# Patient Record
(demographics unavailable — no encounter records)

---

## 2024-12-11 NOTE — PHYSICAL EXAM
[No Acute Distress] : no acute distress [No Varicosities] : no varicosities [No Edema] : there was no peripheral edema [Alert and Oriented x3] : oriented to person, place, and time [Normal] : affect was normal and insight and judgment were intact [de-identified] : done by GYN

## 2024-12-11 NOTE — HEALTH RISK ASSESSMENT
[Very Good] : ~his/her~  mood as very good [Yes] : Yes [Monthly or less (1 pt)] : Monthly or less (1 point) [1 or 2 (0 pts)] : 1 or 2 (0 points) [Never (0 pts)] : Never (0 points) [No] : In the past 12 months have you used drugs other than those required for medical reasons? No [No falls in past year] : Patient reported no falls in the past year [1] : 2) Feeling down, depressed, or hopeless for several days (1) [PHQ-2 Positive] : PHQ-2 Positive [PHQ-9 Positive] : PHQ-9 Positive [Patient reported mammogram was normal] : Patient reported mammogram was normal [Patient reported PAP Smear was normal] : Patient reported PAP Smear was normal [Patient reported colonoscopy was abnormal] : Patient reported colonoscopy was abnormal [HIV Test offered] : HIV Test offered [Hepatitis C test offered] : Hepatitis C test offered [With Family] : lives with family [Employed] : employed [High School] : high school [] :  [# Of Children ___] : has [unfilled] children [Feels Safe at Home] : Feels safe at home [Fully functional (bathing, dressing, toileting, transferring, walking, feeding)] : Fully functional (bathing, dressing, toileting, transferring, walking, feeding) [Fully functional (using the telephone, shopping, preparing meals, housekeeping, doing laundry, using] : Fully functional and needs no help or supervision to perform IADLs (using the telephone, shopping, preparing meals, housekeeping, doing laundry, using transportation, managing medications and managing finances) [Smoke Detector] : smoke detector [Carbon Monoxide Detector] : carbon monoxide detector [Seat Belt] :  uses seat belt [Sunscreen] : uses sunscreen [With Patient/Caregiver] : , with patient/caregiver [Aggressive treatment] : aggressive treatment [Former] : Former [> 15 Years] : > 15 Years [NO] : No [FreeTextEntry1] : none [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

## 2024-12-11 NOTE — HISTORY OF PRESENT ILLNESS
[FreeTextEntry1] : cc: new pt. physical [de-identified] : Pt presented establish care, needs physical. Patient came  for physical. She denies CP,SOB,Abd pain, no N,V,C,D.Pt is under bariatric care, lost 40 lb for the past few months. Pt f/u with Psych re: Anxiety

## 2024-12-11 NOTE — COUNSELING
[Potential consequences of obesity discussed] : Potential consequences of obesity discussed [Benefits of weight loss discussed] : Benefits of weight loss discussed [Encouraged to maintain food diary] : Encouraged to maintain food diary [Encouraged to increase physical activity] : Encouraged to increase physical activity [Weigh Self Weekly] : weigh self weekly [Decrease Portions] : decrease portions [____ min/wk Activity] : [unfilled] min/wk activity [Keep Food Diary] : keep food diary [Good understanding] : Patient has a good understanding of lifestyle changes and steps needed to achieve self management goal

## 2025-03-20 NOTE — HISTORY OF PRESENT ILLNESS
[de-identified] : 44F,  descent, PMHx iron deficiency, anxiety, history of obesity, under bariatric care (lost 50 lbs in rob past year), s/p inguinal hernia repair, referred for hematologic consultation of thrombocytosis.  Patient had annual medical visit in December 2024.  Physical exam was unremarkable, blood work showed platelet count> 500,000, reason for today's consultation.  Patient denies B symptoms, headaches, lightheadedness, visual changes, erythromelalgia.Gives family history of lung cancer (mother); endorses no pertinent family history of hematologic disorders.  Medication list includes metformin and Wellbutrin.  Denies hospitalization, recent exposure to steroids, reports no recent traveling, no close contact with sick family members.  Denies transfusion requirements; has never had bone marrow studies or therapeutic phlebotomies. LMP 3/9/2025  [FreeTextEntry1] : expectant surveillance

## 2025-03-20 NOTE — CONSULT LETTER
[Dear  ___] : Dear  [unfilled], [Consult Letter:] : I had the pleasure of evaluating your patient, [unfilled]. [Please see my note below.] : Please see my note below. [Consult Closing:] : Thank you very much for allowing me to participate in the care of this patient.  If you have any questions, please do not hesitate to contact me. [Sincerely,] : Sincerely, [FreeTextEntry2] : Yuliya Han MD [FreeTextEntry3] : ABDIEL CHRISTIANSON M.D. Hematology/ Oncology [place] [address] Telephone: [phone] Fax: [phone]

## 2025-03-20 NOTE — RESULTS/DATA
[FreeTextEntry1] : I reviewed recent + today's blood work results with patient.  3/12/2025: WBC 7.6, hemoglobin 13.6 g/dL, hematocrit 41.4%, platelets 482,000.  1/28/2024:   WBC 6.5, hemoglobin 13.1 g/dL, hematocrit 39.2%, MCV 92, platelet 374,000   67% neutrophils, 21% lymphocytes, 8% monocytes

## 2025-03-20 NOTE — ASSESSMENT
[FreeTextEntry1] : Ms. GOMEZ 's questions were answered to her satisfaction. She expressed her understanding and willingness to comply with the above recommendations.

## 2025-06-13 NOTE — HISTORY OF PRESENT ILLNESS
[de-identified] : 45F,  descent, PMHx iron deficiency, anxiety, history of obesity, under bariatric care (lost 50 lbs in rob past year), s/p inguinal hernia repair, ,returning for hematologic follow-up of thrombocytosis.          [FreeTextEntry1] : expectant surveillance  [de-identified] : Reporting no significant changes in clinical status and no new symptoms since her last office visit in March 2025.  Physical examination unchanged from previous visit.  Denies B symptoms, has regular menses.  Medication list unchanged; continues bupropion, minoxidil, metformin, but does not take aspirin.  Hematologic profile reviewed-no evidence of myeloproliferative disorder.  Patient denies recent hospitalization or recent venous thrombotic events.